# Patient Record
Sex: MALE | Race: WHITE | NOT HISPANIC OR LATINO | URBAN - METROPOLITAN AREA
[De-identification: names, ages, dates, MRNs, and addresses within clinical notes are randomized per-mention and may not be internally consistent; named-entity substitution may affect disease eponyms.]

---
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
## 2023-07-02 ENCOUNTER — APPOINTMENT (EMERGENCY)
Dept: RADIOLOGY | Facility: HOSPITAL | Age: 23
End: 2023-07-02
Payer: MEDICARE

## 2023-07-02 ENCOUNTER — HOSPITAL ENCOUNTER (EMERGENCY)
Facility: HOSPITAL | Age: 23
Discharge: HOME | End: 2023-07-02
Payer: MEDICARE

## 2023-07-02 VITALS
WEIGHT: 254.85 LBS | RESPIRATION RATE: 13 BRPM | TEMPERATURE: 98.5 F | HEART RATE: 80 BPM | DIASTOLIC BLOOD PRESSURE: 55 MMHG | SYSTOLIC BLOOD PRESSURE: 113 MMHG | OXYGEN SATURATION: 97 %

## 2023-07-02 DIAGNOSIS — K20.90 ESOPHAGITIS: Primary | ICD-10-CM

## 2023-07-02 DIAGNOSIS — M79.606: ICD-10-CM

## 2023-07-02 LAB
2HR DELTA HS TROPONIN: -1 NG/L
ALBUMIN SERPL BCP-MCNC: 4 G/DL (ref 3.5–5)
ALP SERPL-CCNC: 73 U/L (ref 34–104)
ALT SERPL W P-5'-P-CCNC: 27 U/L (ref 7–52)
ANION GAP SERPL CALCULATED.3IONS-SCNC: 7 MMOL/L
AST SERPL W P-5'-P-CCNC: 21 U/L (ref 13–39)
ATRIAL RATE: 81 BPM
ATRIAL RATE: 92 BPM
BASOPHILS # BLD AUTO: 0.09 THOUSANDS/ÂΜL (ref 0–0.1)
BASOPHILS NFR BLD AUTO: 1 % (ref 0–1)
BILIRUB SERPL-MCNC: 0.27 MG/DL (ref 0.2–1)
BUN SERPL-MCNC: 14 MG/DL (ref 5–25)
CALCIUM SERPL-MCNC: 9.2 MG/DL (ref 8.4–10.2)
CARDIAC TROPONIN I PNL SERPL HS: 2 NG/L
CARDIAC TROPONIN I PNL SERPL HS: 3 NG/L
CHLORIDE SERPL-SCNC: 103 MMOL/L (ref 96–108)
CO2 SERPL-SCNC: 30 MMOL/L (ref 21–32)
CREAT SERPL-MCNC: 0.92 MG/DL (ref 0.6–1.3)
D DIMER PPP FEU-MCNC: 0.32 UG/ML FEU
EOSINOPHIL # BLD AUTO: 0.54 THOUSAND/ÂΜL (ref 0–0.61)
EOSINOPHIL NFR BLD AUTO: 6 % (ref 0–6)
ERYTHROCYTE [DISTWIDTH] IN BLOOD BY AUTOMATED COUNT: 14.3 % (ref 11.6–15.1)
GFR SERPL CREATININE-BSD FRML MDRD: 118 ML/MIN/1.73SQ M
GLUCOSE SERPL-MCNC: 101 MG/DL (ref 65–140)
HCT VFR BLD AUTO: 43.3 % (ref 36.5–49.3)
HGB BLD-MCNC: 13.3 G/DL (ref 12–17)
IMM GRANULOCYTES # BLD AUTO: 0.02 THOUSAND/UL (ref 0–0.2)
IMM GRANULOCYTES NFR BLD AUTO: 0 % (ref 0–2)
LYMPHOCYTES # BLD AUTO: 2.9 THOUSANDS/ÂΜL (ref 0.6–4.47)
LYMPHOCYTES NFR BLD AUTO: 30 % (ref 14–44)
MCH RBC QN AUTO: 25.1 PG (ref 26.8–34.3)
MCHC RBC AUTO-ENTMCNC: 30.7 G/DL (ref 31.4–37.4)
MCV RBC AUTO: 82 FL (ref 82–98)
MONOCYTES # BLD AUTO: 0.41 THOUSAND/ÂΜL (ref 0.17–1.22)
MONOCYTES NFR BLD AUTO: 4 % (ref 4–12)
NEUTROPHILS # BLD AUTO: 5.8 THOUSANDS/ÂΜL (ref 1.85–7.62)
NEUTS SEG NFR BLD AUTO: 59 % (ref 43–75)
NRBC BLD AUTO-RTO: 0 /100 WBCS
P AXIS: 54 DEGREES
P AXIS: 57 DEGREES
PLATELET # BLD AUTO: 357 THOUSANDS/UL (ref 149–390)
PMV BLD AUTO: 9.2 FL (ref 8.9–12.7)
POTASSIUM SERPL-SCNC: 4.2 MMOL/L (ref 3.5–5.3)
PR INTERVAL: 132 MS
PR INTERVAL: 150 MS
PROT SERPL-MCNC: 7.1 G/DL (ref 6.4–8.4)
QRS AXIS: 58 DEGREES
QRS AXIS: 66 DEGREES
QRSD INTERVAL: 88 MS
QRSD INTERVAL: 94 MS
QT INTERVAL: 346 MS
QT INTERVAL: 358 MS
QTC INTERVAL: 415 MS
QTC INTERVAL: 427 MS
RBC # BLD AUTO: 5.3 MILLION/UL (ref 3.88–5.62)
SODIUM SERPL-SCNC: 140 MMOL/L (ref 135–147)
T WAVE AXIS: 35 DEGREES
T WAVE AXIS: 36 DEGREES
VENTRICULAR RATE: 81 BPM
VENTRICULAR RATE: 92 BPM
WBC # BLD AUTO: 9.76 THOUSAND/UL (ref 4.31–10.16)

## 2023-07-02 PROCEDURE — 99285 EMERGENCY DEPT VISIT HI MDM: CPT

## 2023-07-02 PROCEDURE — 93010 ELECTROCARDIOGRAM REPORT: CPT | Performed by: STUDENT IN AN ORGANIZED HEALTH CARE EDUCATION/TRAINING PROGRAM

## 2023-07-02 PROCEDURE — 71046 X-RAY EXAM CHEST 2 VIEWS: CPT

## 2023-07-02 PROCEDURE — 93005 ELECTROCARDIOGRAM TRACING: CPT

## 2023-07-02 PROCEDURE — 85379 FIBRIN DEGRADATION QUANT: CPT | Performed by: PHYSICIAN ASSISTANT

## 2023-07-02 PROCEDURE — 84484 ASSAY OF TROPONIN QUANT: CPT | Performed by: PHYSICIAN ASSISTANT

## 2023-07-02 PROCEDURE — 85025 COMPLETE CBC W/AUTO DIFF WBC: CPT | Performed by: PHYSICIAN ASSISTANT

## 2023-07-02 PROCEDURE — 80053 COMPREHEN METABOLIC PANEL: CPT | Performed by: PHYSICIAN ASSISTANT

## 2023-07-02 PROCEDURE — 36415 COLL VENOUS BLD VENIPUNCTURE: CPT | Performed by: PHYSICIAN ASSISTANT

## 2023-07-02 RX ORDER — ACETAMINOPHEN 325 MG/1
975 TABLET ORAL ONCE
Status: COMPLETED | OUTPATIENT
Start: 2023-07-02 | End: 2023-07-02

## 2023-07-02 RX ORDER — ASPIRIN 81 MG/1
324 TABLET, CHEWABLE ORAL ONCE
Status: COMPLETED | OUTPATIENT
Start: 2023-07-02 | End: 2023-07-02

## 2023-07-02 RX ORDER — ALUMINA, MAGNESIA, AND SIMETHICONE 2400; 2400; 240 MG/30ML; MG/30ML; MG/30ML
20 SUSPENSION ORAL AS NEEDED
Qty: 355 ML | Refills: 0 | Status: SHIPPED | OUTPATIENT
Start: 2023-07-02

## 2023-07-02 RX ADMIN — ACETAMINOPHEN 325MG 975 MG: 325 TABLET ORAL at 17:23

## 2023-07-02 RX ADMIN — ASPIRIN 81 MG 324 MG: 81 TABLET ORAL at 17:23

## 2023-07-02 NOTE — ED PROVIDER NOTES
History  Chief Complaint   Patient presents with   • Chest Pain     Reports chest pain since Friday. Also reports "at times it feels like my legs are being torn off"       Chest Pain  Pain location:  Substernal area  Pain quality: burning and pressure    Pain radiates to:  Does not radiate  Pain radiates to the back: no    Pain severity:  Mild  Onset quality:  Gradual  Timing:  Intermittent  Progression:  Improving  Context: movement    Context comment:  Patient states after eating and with belching  Relieved by:  Nothing  Worsened by:  Nothing tried  Ineffective treatments:  None tried  Associated symptoms: heartburn and nausea    Associated symptoms: no abdominal pain, no back pain, no claudication, no diaphoresis, no dizziness, no dysphagia, no fever, no numbness, no orthopnea, no palpitations, no shortness of breath and not vomiting    Risk factors: male sex and obesity    Risk factors: no coronary artery disease, no diabetes mellitus, no high cholesterol, no hypertension and no prior DVT/PE        None       Past Medical History:   Diagnosis Date   • Rhinitis, allergic        History reviewed. No pertinent surgical history. History reviewed. No pertinent family history. I have reviewed and agree with the history as documented. E-Cigarette/Vaping   • E-Cigarette Use Never User      E-Cigarette/Vaping Substances     Social History     Tobacco Use   • Smoking status: Never   • Smokeless tobacco: Never   Vaping Use   • Vaping Use: Never used   Substance Use Topics   • Alcohol use: Not Currently   • Drug use: Yes     Types: Marijuana       Review of Systems   Constitutional: Negative for diaphoresis and fever. HENT: Negative for trouble swallowing. Respiratory: Negative for shortness of breath. Cardiovascular: Positive for chest pain. Negative for palpitations, orthopnea and claudication. Gastrointestinal: Positive for heartburn and nausea. Negative for abdominal pain and vomiting.    Endocrine: Negative for polyphagia and polyuria. Genitourinary: Negative for dysuria. Musculoskeletal: Negative for back pain. Skin: Negative for wound. Neurological: Negative for dizziness and numbness. Hematological: Negative for adenopathy. Psychiatric/Behavioral: Negative for agitation. All other systems reviewed and are negative. Physical Exam  Physical Exam  Vitals reviewed. Constitutional:       Appearance: He is well-developed. He is obese. HENT:      Head: Normocephalic and atraumatic. Eyes:      Extraocular Movements: Extraocular movements intact. Pupils: Pupils are equal, round, and reactive to light. Neck:      Vascular: No JVD. Cardiovascular:      Rate and Rhythm: Normal rate. Heart sounds: Normal heart sounds. Pulmonary:      Effort: Pulmonary effort is normal.      Breath sounds: Normal breath sounds. Chest:      Chest wall: No mass, tenderness or edema. Abdominal:      Palpations: Abdomen is soft. There is no mass. Tenderness: There is no guarding. Comments: Obese abdomen. Musculoskeletal:         General: Normal range of motion. Cervical back: Normal range of motion and neck supple. Right lower leg: No tenderness. No edema. Left lower leg: No tenderness. No edema. Skin:     General: Skin is warm and dry. Neurological:      General: No focal deficit present. Mental Status: He is alert. He is disoriented. Motor: Motor function is intact. Deep Tendon Reflexes:      Reflex Scores:       Patellar reflexes are 2+ on the right side and 2+ on the left side. Achilles reflexes are 2+ on the right side and 2+ on the left side. Comments: Patient ambulatory in room no evidence of foot drop. Normal gait. Sensation intact to light touch lower extremities bilaterally.    Psychiatric:         Mood and Affect: Mood normal.         Vital Signs  ED Triage Vitals [07/02/23 1647]   Temperature Pulse Respirations Blood Pressure SpO2   98.5 °F (36.9 °C) 90 18 133/82 99 %      Temp Source Heart Rate Source Patient Position - Orthostatic VS BP Location FiO2 (%)   Oral Monitor Sitting Right arm --      Pain Score       No Pain           Vitals:    07/02/23 1647 07/02/23 1830 07/02/23 1915   BP: 133/82 117/58 113/55   Pulse: 90 87 80   Patient Position - Orthostatic VS: Sitting Lying Lying         Visual Acuity      ED Medications  Medications   aspirin chewable tablet 324 mg (324 mg Oral Given 7/2/23 1723)   acetaminophen (TYLENOL) tablet 975 mg (975 mg Oral Given 7/2/23 1723)       Diagnostic Studies  Results Reviewed     Procedure Component Value Units Date/Time    HS Troponin I 2hr [[id number]]  (Normal) Collected: 07/02/23 1936    Lab Status: Final result Specimen: Blood from Line, Venous Updated: 07/02/23 2002     hs TnI 2hr 2 ng/L      Delta 2hr hsTnI -1 ng/L     D-Dimer [[id number]]  (Normal) Collected: 07/02/23 1729    Lab Status: Final result Specimen: Blood from Arm, Right Updated: 07/02/23 1821     D-Dimer, Quant 0.32 ug/ml FEU     HS Troponin 0hr (reflex protocol) [[id number]]  (Normal) Collected: 07/02/23 1729    Lab Status: Final result Specimen: Blood from Arm, Right Updated: 07/02/23 1804     hs TnI 0hr 3 ng/L     Comprehensive metabolic panel [[id number]] Collected: 07/02/23 1729    Lab Status: Final result Specimen: Blood from Arm, Right Updated: 07/02/23 1756     Sodium 140 mmol/L      Potassium 4.2 mmol/L      Chloride 103 mmol/L      CO2 30 mmol/L      ANION GAP 7 mmol/L      BUN 14 mg/dL      Creatinine 0.92 mg/dL      Glucose 101 mg/dL      Calcium 9.2 mg/dL      AST 21 U/L      ALT 27 U/L      Alkaline Phosphatase 73 U/L      Total Protein 7.1 g/dL      Albumin 4.0 g/dL      Total Bilirubin 0.27 mg/dL      eGFR 118 ml/min/1.73sq m     Narrative:      [place] guidelines for Chronic Kidney Disease (CKD):   •  Stage 1 with normal or high GFR (GFR > 90 mL/min/1.73 square meters)  •  Stage 2 Mild CKD (GFR = 60-89 mL/min/1.73 square meters)  •  Stage 3A Moderate CKD (GFR = 45-59 mL/min/1.73 square meters)  •  Stage 3B Moderate CKD (GFR = 30-44 mL/min/1.73 square meters)  •  Stage 4 Severe CKD (GFR = 15-29 mL/min/1.73 square meters)  •  Stage 5 End Stage CKD (GFR <15 mL/min/1.73 square meters)  Note: GFR calculation is accurate only with a steady state creatinine    CBC and differential [[id number]]  (Abnormal) Collected: 07/02/23 1729    Lab Status: Final result Specimen: Blood from Arm, Right Updated: 07/02/23 1734     WBC 9.76 Thousand/uL      RBC 5.30 Million/uL      Hemoglobin 13.3 g/dL      Hematocrit 43.3 %      MCV 82 fL      MCH 25.1 pg      MCHC 30.7 g/dL      RDW 14.3 %      MPV 9.2 fL      Platelets 504 Thousands/uL      nRBC 0 /100 WBCs      Neutrophils Relative 59 %      Immat GRANS % 0 %      Lymphocytes Relative 30 %      Monocytes Relative 4 %      Eosinophils Relative 6 %      Basophils Relative 1 %      Neutrophils Absolute 5.80 Thousands/µL      Immature Grans Absolute 0.02 Thousand/uL      Lymphocytes Absolute 2.90 Thousands/µL      Monocytes Absolute 0.41 Thousand/µL      Eosinophils Absolute 0.54 Thousand/µL      Basophils Absolute 0.09 Thousands/µL                  XR chest 2 views   ED Interpretation by Linda Hay PA-C (07/02 2018)   No acute process appreciated. Procedures  Procedures         ED Course                               SBIRT 20yo+    Flowsheet Row Most Recent Value   Initial Alcohol Screen: US AUDIT-C     1. How often do you have a drink containing alcohol? 1 Filed at: 07/02/2023 1729   2. How many drinks containing alcohol do you have on a typical day you are drinking? 0 Filed at: 07/02/2023 1729   3a. Male UNDER 65: How often do you have five or more drinks on one occasion? 0 Filed at: 07/02/2023 1729   Audit-C Score 1 Filed at: 07/02/2023 1729   MARCI: How many times in the past year have you. ..     Used an illegal drug or used a prescription medication for non-medical reasons? Never Filed at: 07/02/2023 1729                    Medical Decision Making  26-year-old male presents for anterior chest pain and bilateral posterior leg discomfort. Past medical history reviewed with patient as stated on chart. Patient states approximately 3 days ago he began to have some anterior chest discomfort while belching. Patient states this has been intermittent. Patient fearful of his heart "blowing up". Patient states that his stepfather had a massive heart attack or heart rupture. Patient's mother who is in the room also states this and this is also her concern. No genetic relation to stepfather. Patient is bright alert active. No acute distress. No chest pain appreciated. Abdomen is obese nontender. Overall neurological exam is benign. No evidence of foot drop or neurological focus. Also complains of right and left posterior leg discomfort provided about into the hamstrings does not radiate and a radicular fashion. Intermittent. Labs reviewed with patient EKG reviewed with patient chest x-ray reviewed with patient and mother. At this time is most likely esophagitis. Encourage patient manage symptoms with lifestyle modifications such as diet improvement physical activity. Provided YouTube videos for dietary instruction. Encouraged patient to obtain primary care as he is now 25years of age and would need an adult primary care. Educated patient to take Mylanta intermittently for symptom relief. Educated on timing of improvement. Educated patient on persistent or worsening sinus symptoms and need to follow-up with primary care and/or return to the emergency department. Patient and mother admits to understanding and agreement. Patient at the end of visit stated that he did have moderate improvement of his overall condition. Patient was felt stable for discharge to home. Patient was discharged in amatory improved condition.     Esophagitis:     Details: Anterior chest discomfort after eating meals with belching. Musculoskeletal leg pain:     Details: Posterior leg discomfort intermittent most likely musculoskeletal leg discomfort. Amount and/or Complexity of Data Reviewed  External Data Reviewed: labs and radiology. Labs: ordered. Details: Labs ordered for chest pain work-up in an obese 71-year-old male  Radiology: ordered and independent interpretation performed. Details: Chest x-ray emergency department work-up in obese 71-year-old male      Risk  OTC drugs. Disposition  Final diagnoses:   Esophagitis   Musculoskeletal leg pain     Time reflects when diagnosis was documented in both MDM as applicable and the Disposition within this note     Time User Action Codes Description Comment    7/2/2023  8:18 PM [employer] Add [K20.90] Esophagitis     7/2/2023  8:19 PM [employer] Add [B37.663] Musculoskeletal leg pain       ED Disposition     ED Disposition   Discharge    Condition   Stable    Date/Time   Sun Jul 2, 2023  8:07 PM    Comment   Jac Babinski discharge to home/self care. Follow-up Information     Follow up With Specialties Details Why Contact Info [facility]   [address] [facility], [address] [place] [address], [address] [facility]          Discharge Medication List as of 7/2/2023  8:20 PM      START taking these medications    Details   aluminum-magnesium hydroxide-simethicone (MAALOX MAX) 400-400-40 MG/5ML suspension Take 20 mL by mouth as needed for indigestion or heartburn, Starting Sun 7/2/2023, Normal             No discharge procedures on file.     PDMP Review     None          ED Provider  Electronically Signed by           Laurel Tabor PA-C  07/03/23 2269

## 2023-07-03 NOTE — ED NOTES
Pt AVS reviewed by ED provider. Pt IV removed by ED provider. This RN not at the bedside.       Winsome Moe RN  07/02/23 0455

## 2024-05-01 ENCOUNTER — HOSPITAL ENCOUNTER (EMERGENCY)
Facility: HOSPITAL | Age: 24
Discharge: HOME | End: 2024-05-01
Attending: EMERGENCY MEDICINE
Payer: MEDICARE

## 2024-05-01 ENCOUNTER — APPOINTMENT (EMERGENCY)
Dept: RADIOLOGY | Facility: HOSPITAL | Age: 24
End: 2024-05-01
Payer: MEDICARE

## 2024-05-01 VITALS
SYSTOLIC BLOOD PRESSURE: 120 MMHG | RESPIRATION RATE: 20 BRPM | WEIGHT: 258.82 LBS | TEMPERATURE: 98.1 F | HEART RATE: 87 BPM | OXYGEN SATURATION: 94 % | DIASTOLIC BLOOD PRESSURE: 67 MMHG

## 2024-05-01 DIAGNOSIS — J45.901 ASTHMA EXACERBATION: Primary | ICD-10-CM

## 2024-05-01 LAB
2HR DELTA HS TROPONIN: -2 NG/L
ATRIAL RATE: 102 BPM
ATRIAL RATE: 94 BPM
BASOPHILS # BLD AUTO: 0.1 THOUSANDS/ÂΜL (ref 0–0.1)
BASOPHILS NFR BLD AUTO: 1 % (ref 0–1)
CARDIAC TROPONIN I PNL SERPL HS: 5 NG/L
CARDIAC TROPONIN I PNL SERPL HS: 7 NG/L
EOSINOPHIL # BLD AUTO: 0.49 THOUSAND/ÂΜL (ref 0–0.61)
EOSINOPHIL NFR BLD AUTO: 5 % (ref 0–6)
ERYTHROCYTE [DISTWIDTH] IN BLOOD BY AUTOMATED COUNT: 13.3 % (ref 11.6–15.1)
FLUAV RNA RESP QL NAA+PROBE: NEGATIVE
FLUBV RNA RESP QL NAA+PROBE: NEGATIVE
HCT VFR BLD AUTO: 45.5 % (ref 36.5–49.3)
HGB BLD-MCNC: 14.1 G/DL (ref 12–17)
IMM GRANULOCYTES # BLD AUTO: 0.02 THOUSAND/UL (ref 0–0.2)
IMM GRANULOCYTES NFR BLD AUTO: 0 % (ref 0–2)
LYMPHOCYTES # BLD AUTO: 3.39 THOUSANDS/ÂΜL (ref 0.6–4.47)
LYMPHOCYTES NFR BLD AUTO: 34 % (ref 14–44)
MCH RBC QN AUTO: 26.2 PG (ref 26.8–34.3)
MCHC RBC AUTO-ENTMCNC: 31 G/DL (ref 31.4–37.4)
MCV RBC AUTO: 84 FL (ref 82–98)
MONOCYTES # BLD AUTO: 0.58 THOUSAND/ÂΜL (ref 0.17–1.22)
MONOCYTES NFR BLD AUTO: 6 % (ref 4–12)
NEUTROPHILS # BLD AUTO: 5.27 THOUSANDS/ÂΜL (ref 1.85–7.62)
NEUTS SEG NFR BLD AUTO: 54 % (ref 43–75)
NRBC BLD AUTO-RTO: 0 /100 WBCS
P AXIS: 56 DEGREES
P AXIS: 69 DEGREES
PLATELET # BLD AUTO: 362 THOUSANDS/UL (ref 149–390)
PMV BLD AUTO: 9.2 FL (ref 8.9–12.7)
PR INTERVAL: 136 MS
PR INTERVAL: 140 MS
QRS AXIS: 67 DEGREES
QRS AXIS: 78 DEGREES
QRSD INTERVAL: 90 MS
QRSD INTERVAL: 92 MS
QT INTERVAL: 330 MS
QT INTERVAL: 352 MS
QTC INTERVAL: 430 MS
QTC INTERVAL: 440 MS
RBC # BLD AUTO: 5.39 MILLION/UL (ref 3.88–5.62)
RSV RNA RESP QL NAA+PROBE: NEGATIVE
SARS-COV-2 RNA RESP QL NAA+PROBE: NEGATIVE
T WAVE AXIS: 27 DEGREES
T WAVE AXIS: 53 DEGREES
VENTRICULAR RATE: 102 BPM
VENTRICULAR RATE: 94 BPM
WBC # BLD AUTO: 9.85 THOUSAND/UL (ref 4.31–10.16)

## 2024-05-01 PROCEDURE — 99285 EMERGENCY DEPT VISIT HI MDM: CPT | Performed by: EMERGENCY MEDICINE

## 2024-05-01 PROCEDURE — 84484 ASSAY OF TROPONIN QUANT: CPT

## 2024-05-01 PROCEDURE — 94640 AIRWAY INHALATION TREATMENT: CPT

## 2024-05-01 PROCEDURE — 93010 ELECTROCARDIOGRAM REPORT: CPT | Performed by: INTERNAL MEDICINE

## 2024-05-01 PROCEDURE — 36415 COLL VENOUS BLD VENIPUNCTURE: CPT

## 2024-05-01 PROCEDURE — 99285 EMERGENCY DEPT VISIT HI MDM: CPT

## 2024-05-01 PROCEDURE — 93005 ELECTROCARDIOGRAM TRACING: CPT

## 2024-05-01 PROCEDURE — 96374 THER/PROPH/DIAG INJ IV PUSH: CPT

## 2024-05-01 PROCEDURE — 80053 COMPREHEN METABOLIC PANEL: CPT

## 2024-05-01 PROCEDURE — 85025 COMPLETE CBC W/AUTO DIFF WBC: CPT

## 2024-05-01 PROCEDURE — 71045 X-RAY EXAM CHEST 1 VIEW: CPT

## 2024-05-01 PROCEDURE — 0241U HB NFCT DS VIR RESP RNA 4 TRGT: CPT

## 2024-05-01 RX ORDER — ALBUTEROL SULFATE 2.5 MG/3ML
5 SOLUTION RESPIRATORY (INHALATION) ONCE
Status: COMPLETED | OUTPATIENT
Start: 2024-05-01 | End: 2024-05-01

## 2024-05-01 RX ORDER — ALBUTEROL SULFATE 90 UG/1
2 AEROSOL, METERED RESPIRATORY (INHALATION) EVERY 6 HOURS PRN
Qty: 8.5 G | Refills: 0 | Status: SHIPPED | OUTPATIENT
Start: 2024-05-01

## 2024-05-01 RX ORDER — PREDNISONE 20 MG/1
40 TABLET ORAL ONCE
Status: COMPLETED | OUTPATIENT
Start: 2024-05-01 | End: 2024-05-01

## 2024-05-01 RX ORDER — KETOROLAC TROMETHAMINE 30 MG/ML
15 INJECTION, SOLUTION INTRAMUSCULAR; INTRAVENOUS ONCE
Status: COMPLETED | OUTPATIENT
Start: 2024-05-01 | End: 2024-05-01

## 2024-05-01 RX ORDER — LIDOCAINE 50 MG/G
1 PATCH TOPICAL ONCE
Status: DISCONTINUED | OUTPATIENT
Start: 2024-05-01 | End: 2024-05-01 | Stop reason: HOSPADM

## 2024-05-01 RX ORDER — PREDNISONE 20 MG/1
40 TABLET ORAL DAILY
Qty: 8 TABLET | Refills: 0 | Status: SHIPPED | OUTPATIENT
Start: 2024-05-02 | End: 2024-05-06

## 2024-05-01 RX ADMIN — LIDOCAINE 1 PATCH: 700 PATCH TOPICAL at 02:04

## 2024-05-01 RX ADMIN — IPRATROPIUM BROMIDE 0.5 MG: 0.5 SOLUTION RESPIRATORY (INHALATION) at 01:26

## 2024-05-01 RX ADMIN — KETOROLAC TROMETHAMINE 15 MG: 30 INJECTION, SOLUTION INTRAMUSCULAR; INTRAVENOUS at 02:02

## 2024-05-01 RX ADMIN — PREDNISONE 40 MG: 20 TABLET ORAL at 01:25

## 2024-05-01 RX ADMIN — ALBUTEROL SULFATE 5 MG: 2.5 SOLUTION RESPIRATORY (INHALATION) at 01:26

## 2024-05-01 NOTE — ED NOTES
Patient seen by provider with discharge and follow up instructions given to patient and family. Patient offered no complaints on discharge.     Amy Newman RN  05/01/24 [id number]

## 2024-05-01 NOTE — ED ATTENDING ATTESTATION
5/1/2024  IRand MD, saw and evaluated the patient. I have discussed the patient with the resident/non-physician practitioner and agree with the resident's/non-physician practitioner's findings, Plan of Care, and MDM as documented in the resident's/non-physician practitioner's note, except where noted. All available labs and Radiology studies were reviewed.  I was present for key portions of any procedure(s) performed by the resident/non-physician practitioner and I was immediately available to provide assistance.       At this point I agree with the current assessment done in the Emergency Department.  I have conducted an independent evaluation of this patient a history and physical is as follows:    Chief Complaint   Patient presents with    Chest Pain     Arrives reporting central chest pain since 7pm tonight when he was carrying laundry. + SOB and coughing.         HPI:  22 yo M mild intermittent asthma, presents to ed for eval of sob and chest discomfort / tightness. Ongoing since tonight. +cough. Non productive. Didn't have his inhaler, gave his to his aunt. No fevers.    Patient denies prior intubations or hospitalizations for asthma.   Speaking in full sentences without difficulty.   Last steroid use: unknown        Past Medical History:   Diagnosis Date    Asthma     Rhinitis, allergic         No current facility-administered medications for this encounter.    Current Outpatient Medications:     albuterol (ProAir HFA) 90 mcg/act inhaler, Inhale 2 puffs every 6 (six) hours as needed for wheezing, Disp: 8.5 g, Rfl: 0    [START ON 5/2/2024] predniSONE 20 mg tablet, Take 2 tablets (40 mg total) by mouth daily for 4 days Do not start before May 2, 2024., Disp: 8 tablet, Rfl: 0    aluminum-magnesium hydroxide-simethicone (MAALOX MAX) 400-400-40 MG/5ML suspension, Take 20 mL by mouth as needed for indigestion or heartburn (Patient not taking: Reported on 5/1/2024), Disp: 355 mL, Rfl: 0     Exam:  Diffuse  insp /exp wheezing throughout  Otherwise well appearing    A/P:  Mild asthma exacerbation. Duo-neb prednisone. Delta trop negative.  Pcp follow up.     ED Course         Critical Care Time  Procedures

## 2024-05-01 NOTE — DISCHARGE INSTRUCTIONS
Take steroids once daily for 4 more days starting THURSDAY  Inhaler -- 2 puffs every 4-6 hours as needed until flare has passed

## 2024-05-01 NOTE — Clinical Note
Abelardo Burch was seen and treated in our emergency department on 5/1/2024.                Diagnosis:     Abelardo  is off the rest of the shift today, may return to work on return date.    He may return on this date: 05/03/2024         If you have any questions or concerns, please don't hesitate to call.      Sue Parada MD    ______________________________           _______________          _______________  Hospital Representative                              Date                                Time

## 2024-05-01 NOTE — ED PROVIDER NOTES
History  Chief Complaint   Patient presents with    Chest Pain     Arrives reporting central chest pain since 7pm tonight when he was carrying laundry. + SOB and coughing.      HPI 23yoM with hx of asthma presents to the ED for evaluation of no radiating, substernal chest tightness that started around 4pm then went away, then came back at 6pm and has been constant since. Worse when he laid down to go to sleep. Associated with non productive cough. Denies pleuritic chest pain. No prior hx of DVT or PE, no recent surgeries, denies unilateral calf pain or swelling. States he was doing some laundry today and lifting the laundry cart up and down some steps and then helping his neighbor move some stuff. Did not try any therapies PTA.    Prior to Admission Medications   Prescriptions Last Dose Informant Patient Reported? Taking?   aluminum-magnesium hydroxide-simethicone (MAALOX MAX) 400-400-40 MG/5ML suspension Not Taking  No No   Sig: Take 20 mL by mouth as needed for indigestion or heartburn   Patient not taking: Reported on 5/1/2024      Facility-Administered Medications: None       Past Medical History:   Diagnosis Date    Asthma     Rhinitis, allergic        History reviewed. No pertinent surgical history.    History reviewed. No pertinent family history.  I have reviewed and agree with the history as documented.    E-Cigarette/Vaping    E-Cigarette Use Never User      E-Cigarette/Vaping Substances     Social History     Tobacco Use    Smoking status: Never    Smokeless tobacco: Never   Vaping Use    Vaping status: Never Used   Substance Use Topics    Alcohol use: Not Currently    Drug use: Yes     Types: Marijuana        Review of Systems   Constitutional:  Negative for chills and fever.   HENT:  Negative for ear pain and sore throat.    Eyes:  Negative for pain and visual disturbance.   Respiratory:  Positive for cough and chest tightness. Negative for shortness of breath.    Cardiovascular:  Negative for chest  pain and leg swelling.   Gastrointestinal:  Negative for abdominal pain, blood in stool, constipation, diarrhea, nausea and vomiting.   Genitourinary:  Negative for dysuria and hematuria.   Musculoskeletal:  Negative for neck pain and neck stiffness.   Neurological:  Negative for dizziness, syncope, weakness and light-headedness.   All other systems reviewed and are negative.      Physical Exam  ED Triage Vitals   Temperature Pulse Respirations Blood Pressure SpO2   05/01/24 0109 05/01/24 0109 05/01/24 0109 05/01/24 0109 05/01/24 0109   98.1 °F (36.7 °C) 100 20 140/75 94 %      Temp Source Heart Rate Source Patient Position - Orthostatic VS BP Location FiO2 (%)   05/01/24 0109 05/01/24 0109 05/01/24 0109 05/01/24 0109 --   Oral Monitor Lying Left arm       Pain Score       05/01/24 0202       8             Orthostatic Vital Signs  Vitals:    05/01/24 0109 05/01/24 0314   BP: 140/75 117/69   Pulse: 100 88   Patient Position - Orthostatic VS: Lying Sitting       Physical Exam  Vitals and nursing note reviewed.   Constitutional:       General: He is not in acute distress.     Appearance: He is well-developed. He is not ill-appearing, toxic-appearing or diaphoretic.   HENT:      Head: Normocephalic and atraumatic.   Eyes:      Extraocular Movements: Extraocular movements intact.      Conjunctiva/sclera: Conjunctivae normal.   Cardiovascular:      Rate and Rhythm: Normal rate and regular rhythm.      Heart sounds: Normal heart sounds. No murmur heard.  Pulmonary:      Effort: Pulmonary effort is normal. No respiratory distress.      Breath sounds: Examination of the right-upper field reveals wheezing. Examination of the left-upper field reveals wheezing. Examination of the right-middle field reveals wheezing. Examination of the left-middle field reveals wheezing. Examination of the right-lower field reveals wheezing. Examination of the left-lower field reveals wheezing. Wheezing present. No decreased breath sounds,  rhonchi or rales.   Chest:      Chest wall: No mass, deformity, tenderness, crepitus or edema. There is no dullness to percussion.   Abdominal:      Palpations: Abdomen is soft. There is no mass.      Tenderness: There is no abdominal tenderness. There is no guarding or rebound.   Musculoskeletal:         General: No swelling.      Cervical back: Neck supple.      Right lower leg: No tenderness. No edema.      Left lower leg: No tenderness. No edema.   Skin:     General: Skin is warm and dry.      Capillary Refill: Capillary refill takes less than 2 seconds.      Coloration: Skin is not cyanotic or pale.      Findings: No ecchymosis, erythema or rash.      Nails: There is no clubbing.   Neurological:      General: No focal deficit present.      Mental Status: He is alert and oriented to person, place, and time.   Psychiatric:         Mood and Affect: Mood normal.         ED Medications  Medications   lidocaine (LIDODERM) 5 % patch 1 patch (1 patch Topical Medication Applied 5/1/24 0204)   albuterol inhalation solution 5 mg (5 mg Nebulization Given 5/1/24 0126)   ipratropium (ATROVENT) 0.02 % inhalation solution 0.5 mg (0.5 mg Nebulization Given 5/1/24 0126)   predniSONE tablet 40 mg (40 mg Oral Given 5/1/24 0125)   ketorolac (TORADOL) injection 15 mg (15 mg Intravenous Given 5/1/24 0202)       Diagnostic Studies  Results Reviewed       Procedure Component Value Units Date/Time    HS Troponin I 2hr [[id number]]  (Normal) Collected: 05/01/24 0308    Lab Status: Final result Specimen: Blood from Arm, Right Updated: 05/01/24 0337     hs TnI 2hr 5 ng/L      Delta 2hr hsTnI -2 ng/L     HS Troponin I 4hr [[id number]]     Lab Status: No result Specimen: Blood     FLU/RSV/COVID - if FLU/RSV clinically relevant [[id number]]  (Normal) Collected: 05/01/24 0129    Lab Status: Final result Specimen: Nares from Nose Updated: 05/01/24 0212     SARS-CoV-2 Negative     INFLUENZA A PCR Negative     INFLUENZA B PCR Negative     RSV PCR  Negative    Narrative:      FOR PEDIATRIC PATIENTS - copy/paste COVID Guidelines URL to browser: [email or web address]    SARS-CoV-2 assay is a Nucleic Acid Amplification assay intended for the  qualitative detection of nucleic acid from SARS-CoV-2 in nasopharyngeal  swabs. Results are for the presumptive identification of SARS-CoV-2 RNA.    Positive results are indicative of infection with SARS-CoV-2, the virus  causing COVID-19, but do not rule out bacterial infection or co-infection  with other viruses. Laboratories within the [place] and its  territories are required to report all positive results to the appropriate  public health authorities. Negative results do not preclude SARS-CoV-2  infection and should not be used as the sole basis for treatment or other  patient management decisions. Negative results must be combined with  clinical observations, patient history, and epidemiological information.  This test has not been FDA cleared or approved.    This test has been authorized by FDA under an Emergency Use Authorization  (EUA). This test is only authorized for the duration of time the  declaration that circumstances exist justifying the authorization of the  emergency use of an in vitro diagnostic tests for detection of SARS-CoV-2  virus and/or diagnosis of COVID-19 infection under section 564(b)(1) of  the Act, 21 U.S.C. 360bbb-3(b)(1), unless the authorization is terminated  or revoked sooner. The test has been validated but independent review by FDA  and CLIA is pending.    Test performed using [employer] GeneXpert: This RT-PCR assay targets N2,  a region unique to SARS-CoV-2. A conserved region in the E-gene was chosen  for pan-Sarbecovirus detection which includes SARS-CoV-2.    According to CMS-2020-01-R, this platform meets the definition of high-throughput technology.    HS Troponin 0hr (reflex protocol) [[id number]]  (Normal) Collected:  05/01/24 0118    Lab Status: Final result Specimen: Blood from Arm, Right Updated: 05/01/24 0152     hs TnI 0hr 7 ng/L     Comprehensive metabolic panel [[id number]] Collected: 05/01/24 0118    Lab Status: Final result Specimen: Blood from Arm, Right Updated: 05/01/24 0148     Sodium 139 mmol/L      Potassium 4.2 mmol/L      Chloride 102 mmol/L      CO2 29 mmol/L      ANION GAP 8 mmol/L      BUN 22 mg/dL      Creatinine 0.99 mg/dL      Glucose 83 mg/dL      Calcium 8.8 mg/dL      AST 27 U/L      ALT 29 U/L      Alkaline Phosphatase 71 U/L      Total Protein 7.7 g/dL      Albumin 4.1 g/dL      Total Bilirubin 0.22 mg/dL      eGFR 107 ml/min/1.73sq m     Narrative:      National Kidney Disease Foundation guidelines for Chronic Kidney Disease (CKD):     Stage 1 with normal or high GFR (GFR > 90 mL/min/1.73 square meters)    Stage 2 Mild CKD (GFR = 60-89 mL/min/1.73 square meters)    Stage 3A Moderate CKD (GFR = 45-59 mL/min/1.73 square meters)    Stage 3B Moderate CKD (GFR = 30-44 mL/min/1.73 square meters)    Stage 4 Severe CKD (GFR = 15-29 mL/min/1.73 square meters)    Stage 5 End Stage CKD (GFR <15 mL/min/1.73 square meters)  Note: GFR calculation is accurate only with a steady state creatinine    CBC and differential [[id number]]  (Abnormal) Collected: 05/01/24 0118    Lab Status: Final result Specimen: Blood from Arm, Right Updated: 05/01/24 0131     WBC 9.85 Thousand/uL      RBC 5.39 Million/uL      Hemoglobin 14.1 g/dL      Hematocrit 45.5 %      MCV 84 fL      MCH 26.2 pg      MCHC 31.0 g/dL      RDW 13.3 %      MPV 9.2 fL      Platelets 362 Thousands/uL      nRBC 0 /100 WBCs      Segmented % 54 %      Immature Grans % 0 %      Lymphocytes % 34 %      Monocytes % 6 %      Eosinophils Relative 5 %      Basophils Relative 1 %      Absolute Neutrophils 5.27 Thousands/µL      Absolute Immature Grans 0.02 Thousand/uL      Absolute Lymphocytes 3.39 Thousands/µL      Absolute Monocytes 0.58 Thousand/µL      Eosinophils  Absolute 0.49 Thousand/µL      Basophils Absolute 0.10 Thousands/µL                    XR chest 1 view portable   ED Interpretation by Sue Parada MD (05/01 0224)   No evidence of acute cardiopulmonary process (focal consolidation, PTA, pleural effusion). Interpreted by me.             Procedures  ECG 12 Lead Documentation Only    Date/Time: 5/1/2024 1:25 AM    Performed by: Sue Parada MD  Authorized by: Sue Parada MD    Indications / Diagnosis:  Chest tightness  ECG reviewed by me, the ED Provider: yes    Patient location:  ED  Rate:     ECG rate:  102    ECG rate assessment: tachycardic    Rhythm:     Rhythm: sinus tachycardia    Ectopy:     Ectopy: none    QRS:     QRS axis:  Normal    QRS intervals:  Normal  Conduction:     Conduction: normal    ST segments:     ST segments:  Normal  T waves:     T waves: normal    Comments:      Qtc 430  ECG 12 Lead Documentation Only    Date/Time: 5/1/2024 3:36 AM    Performed by: Sue Parada MD  Authorized by: Sue Parada MD    Indications / Diagnosis:  2nd troponin  ECG reviewed by me, the ED Provider: yes    Patient location:  ED  Previous ECG:     Previous ECG:  Compared to current  Rate:     ECG rate:  94    ECG rate assessment: normal    Rhythm:     Rhythm: sinus rhythm    Ectopy:     Ectopy: none    QRS:     QRS axis:  Normal    QRS intervals:  Normal  Conduction:     Conduction: normal    ST segments:     ST segments:  Normal  T waves:     T waves: normal    Comments:      Qtc 440        ED Course  ED Course as of 05/01/24 0357   Wed May 01, 2024   0225 hs TnI 0hr: 7  Will obtain 2hr delta. EKG without evidence of acute ischemia.    0225 Flu covid RSV negative.    0225 Patient reports feeling improved after nebulizer treatment.    0345 Delta 2hr hsTnI: -2   0353 Wheezing resolved after nebulizer. Patient reports improvement in chest tightness.              HEART Risk Score      Flowsheet Row Most Recent Value   Heart Score Risk  Calculator    History 0 Filed at: 05/01/2024 0241   ECG 0 Filed at: 05/01/2024 0241   Age 0 Filed at: 05/01/2024 0241   Risk Factors 1 Filed at: 05/01/2024 0241   Troponin 0 Filed at: 05/01/2024 0241   HEART Score 1 Filed at: 05/01/2024 0241                        SBIRT 22yo+      Flowsheet Row Most Recent Value   Initial Alcohol Screen: US AUDIT-C     1. How often do you have a drink containing alcohol? 0 Filed at: 05/01/2024 0108   2. How many drinks containing alcohol do you have on a typical day you are drinking?  0 Filed at: 05/01/2024 0108   3a. Male UNDER 65: How often do you have five or more drinks on one occasion? 0 Filed at: 05/01/2024 0108   Audit-C Score 0 Filed at: 05/01/2024 0108   MARCI: How many times in the past year have you...    Used an illegal drug or used a prescription medication for non-medical reasons? Never Filed at: 05/01/2024 0108                  Medical Decision Making  23yoM with hx of asthma presents to the ED for evaluation of no radiating, substernal chest tightness that started around 4pm then went away, then came back at 6pm and has been constant since. Initially tachycardic in triage but HR 80s back in the room. Vitals wnl. Afebrile. Patient awake, alert, oriented. Speaking in full sentences, normal work of breathing. On exam, chest sounds tight with bilateral diffuse wheezing. Otherwise exam is unremarkable.     EKG without acute ischemic changes; troponin supports this. CXR without focal consolidation. Wheezing and chest tightness resolved after nebulizer treatment. Will send home with short course of steroids and inhaler. Discussed strict return precautions. Patient verbalizes his understanding. Discharged in stable condition.    Amount and/or Complexity of Data Reviewed  Labs:  Decision-making details documented in ED Course.  Radiology: independent interpretation performed.    Risk  Prescription drug management.          Disposition  Final diagnoses:   Asthma exacerbation      Time reflects when diagnosis was documented in both MDM as applicable and the Disposition within this note       Time User Action Codes Description Comment    5/1/2024  3:19 AM Sue Parada Add [J45.901] Asthma exacerbation           ED Disposition       ED Disposition   Discharge    Condition   Stable    Date/Time   Wed May 1, 2024 0315    Comment   Abelardo Burch Jr. discharge to home/self care.                   Follow-up Information       Follow up With Specialties Details Why Contact Info Additional Information    [facility] Schedule an appointment as soon as possible for a visit in 1 week For follow up regarding your symptoms and recheck [address] [place] [id number] [phone] [facility], [address] [place] [address], [address] [phone]            Patient's Medications   Discharge Prescriptions    ALBUTEROL (PROAIR HFA) 90 MCG/ACT INHALER    Inhale 2 puffs every 6 (six) hours as needed for wheezing       Start Date: 5/1/2024  End Date: --       Order Dose: 2 puffs       Quantity: 8.5 g    Refills: 0    PREDNISONE 20 MG TABLET    Take 2 tablets (40 mg total) by mouth daily for 4 days Do not start before May 2, 2024.       Start Date: 5/2/2024  End Date: 5/6/2024       Order Dose: 40 mg       Quantity: 8 tablet    Refills: 0     No discharge procedures on file.    PDMP Review       None             ED Provider  Attending physically available and evaluated Abelardo Burch Jr.. I managed the patient along with the ED Attending.    Electronically Signed by           Sue Parada MD  05/01/24 0350

## 2024-05-01 NOTE — ED NOTES
Patient sleeping in bed, complains of minor pain 2/10 in chest. States that it is better at this time, provider made aware. No further needs stated at this time.      Peggy Snow RN  05/01/24 [id number]

## 2024-05-08 LAB
ALBUMIN SERPL BCP-MCNC: 4.1 G/DL (ref 3.5–5)
ALP SERPL-CCNC: 71 U/L (ref 34–104)
ALT SERPL W P-5'-P-CCNC: 29 U/L (ref 7–52)
ANION GAP SERPL CALCULATED.3IONS-SCNC: 8 MMOL/L (ref 4–13)
AST SERPL W P-5'-P-CCNC: 27 U/L (ref 13–39)
BILIRUB SERPL-MCNC: 0.22 MG/DL (ref 0.2–1)
BUN SERPL-MCNC: 22 MG/DL (ref 5–25)
CALCIUM SERPL-MCNC: 8.8 MG/DL (ref 8.4–10.2)
CHLORIDE SERPL-SCNC: 102 MMOL/L (ref 96–108)
CO2 SERPL-SCNC: 29 MMOL/L (ref 21–32)
CREAT SERPL-MCNC: 0.99 MG/DL (ref 0.6–1.3)
GFR SERPL CREATININE-BSD FRML MDRD: 107 ML/MIN/1.73SQ M
GLUCOSE SERPL-MCNC: 83 MG/DL (ref 65–140)
POTASSIUM SERPL-SCNC: 4.2 MMOL/L (ref 3.5–5.3)
PROT SERPL-MCNC: 7.7 G/DL (ref 6.4–8.4)
SODIUM SERPL-SCNC: 139 MMOL/L (ref 135–147)

## 2024-08-28 ENCOUNTER — APPOINTMENT (OUTPATIENT)
Dept: LAB | Facility: HOSPITAL | Age: 24
End: 2024-08-28
Payer: MEDICARE

## 2025-05-03 ENCOUNTER — APPOINTMENT (EMERGENCY)
Dept: RADIOLOGY | Facility: HOSPITAL | Age: 25
End: 2025-05-03
Payer: MEDICARE

## 2025-05-03 ENCOUNTER — HOSPITAL ENCOUNTER (EMERGENCY)
Facility: HOSPITAL | Age: 25
Discharge: HOME | End: 2025-05-03
Attending: EMERGENCY MEDICINE
Payer: MEDICARE

## 2025-05-03 VITALS
HEART RATE: 86 BPM | OXYGEN SATURATION: 97 % | WEIGHT: 270.28 LBS | DIASTOLIC BLOOD PRESSURE: 71 MMHG | SYSTOLIC BLOOD PRESSURE: 144 MMHG | TEMPERATURE: 97.9 F | RESPIRATION RATE: 16 BRPM

## 2025-05-03 DIAGNOSIS — R06.2 WHEEZING: Primary | ICD-10-CM

## 2025-05-03 PROCEDURE — 94640 AIRWAY INHALATION TREATMENT: CPT

## 2025-05-03 PROCEDURE — 99284 EMERGENCY DEPT VISIT MOD MDM: CPT | Performed by: EMERGENCY MEDICINE

## 2025-05-03 PROCEDURE — 99284 EMERGENCY DEPT VISIT MOD MDM: CPT

## 2025-05-03 PROCEDURE — 71046 X-RAY EXAM CHEST 2 VIEWS: CPT

## 2025-05-03 RX ORDER — PREDNISONE 20 MG/1
60 TABLET ORAL DAILY
Qty: 15 TABLET | Refills: 0 | Status: SHIPPED | OUTPATIENT
Start: 2025-05-03 | End: 2025-05-08

## 2025-05-03 RX ORDER — FLUTICASONE PROPIONATE 50 MCG
1 SPRAY, SUSPENSION (ML) NASAL DAILY
Qty: 16 G | Refills: 0 | Status: SHIPPED | OUTPATIENT
Start: 2025-05-03

## 2025-05-03 RX ORDER — PREDNISONE 20 MG/1
60 TABLET ORAL ONCE
Status: COMPLETED | OUTPATIENT
Start: 2025-05-03 | End: 2025-05-03

## 2025-05-03 RX ORDER — IPRATROPIUM BROMIDE AND ALBUTEROL SULFATE 2.5; .5 MG/3ML; MG/3ML
3 SOLUTION RESPIRATORY (INHALATION) ONCE
Status: COMPLETED | OUTPATIENT
Start: 2025-05-03 | End: 2025-05-03

## 2025-05-03 RX ORDER — ALBUTEROL SULFATE 90 UG/1
2 INHALANT RESPIRATORY (INHALATION) EVERY 6 HOURS PRN
Qty: 8.5 G | Refills: 0 | Status: SHIPPED | OUTPATIENT
Start: 2025-05-03

## 2025-05-03 RX ADMIN — IPRATROPIUM BROMIDE AND ALBUTEROL SULFATE 3 ML: 2.5; .5 SOLUTION RESPIRATORY (INHALATION) at 18:54

## 2025-05-03 RX ADMIN — PREDNISONE 60 MG: 20 TABLET ORAL at 18:54

## 2025-05-03 NOTE — Clinical Note
Abelardo Burch was seen and treated in our emergency department on 5/3/2025.                Diagnosis:     Abelardo  may return to work on return date.    He may return on this date: 05/04/2025         If you have any questions or concerns, please don't hesitate to call.      Erin Langston, DO    ______________________________           _______________          _______________  Hospital Representative                              Date                                Time

## 2025-05-03 NOTE — ED PROVIDER NOTES
Pt Name: Abelardo Burch Jr.  MRN: [id number]  Birthdate 2000  Age/Sex: 24 y.o. male  Date of evaluation: 5/3/2025  PCP: No primary care provider on file.        FINAL IMPRESSION    Final diagnoses:   Wheezing         DISPOSITION/PLAN      Time reflects when diagnosis was documented in both MDM as applicable and the Disposition within this note       Time User Action Codes Description Comment    5/3/2025  7:13 PM Erin Langston Add [R06.2] Wheezing           ED Disposition       ED Disposition   Discharge    Condition   Stable    Date/Time   Sat May 3, 2025  7:12 PM    Comment   Abelardo Burch Jr. discharge to home/self care.                   Follow-up Information       Follow up With Specialties Details Why Contact Info Additional Information    [facility] Emergency Medicine Go to  As needed, If symptoms worsen [address] [place] [id number]  [phone] [facility], [address] [place] [address], [facility]              PATIENT REFERRED TO:    [facility] Emergency Department  [address] [place] [id number]  [phone]  Go to   As needed, If symptoms worsen      DISCHARGE MEDICATIONS:    Discharge Medication List as of 5/3/2025  7:18 PM        START taking these medications    Details   !! albuterol (ProAir HFA) 90 mcg/act inhaler Inhale 2 puffs every 6 (six) hours as needed for wheezing, Starting Sat 5/3/2025, Normal      fluticasone (FLONASE) 50 mcg/act nasal spray 1 spray into each nostril daily, Starting Sat 5/3/2025, Normal      loratadine-pseudoephedrine (CLARITIN-D 24-HOUR)  mg per 24 hr tablet Take 1 tablet by mouth daily, Starting Sat 5/3/2025, Normal      predniSONE 20 mg tablet Take 3 tablets (60 mg total) by mouth daily for 5 days, Starting Sat 5/3/2025, Until Thu 5/8/2025, Normal       !! - Potential duplicate medications found. Please discuss with  provider.        CONTINUE these medications which have NOT CHANGED    Details   !! albuterol (ProAir HFA) 90 mcg/act inhaler Inhale 2 puffs every 6 (six) hours as needed for wheezing, Starting Wed 5/1/2024, Normal      aluminum-magnesium hydroxide-simethicone (MAALOX MAX) 400-400-40 MG/5ML suspension Take 20 mL by mouth as needed for indigestion or heartburn, Starting Sun 7/2/2023, Normal       !! - Potential duplicate medications found. Please discuss with provider.          No discharge procedures on file.          CHIEF COMPLAINT    Chief Complaint   Patient presents with    Shortness of Breath     Pt been feeling intermittent SOB for the last week. Pt stated last night he woke up gasping for air. Associated symptoms of a cough          HPI    Ableardo presents to the Emergency Department complaining of wheezing and cough.  No fever.  SOB with exertion.       HPI      Past Medical and Surgical History    Past Medical History:   Diagnosis Date    Asthma     Rhinitis, allergic        History reviewed. No pertinent surgical history.    History reviewed. No pertinent family history.    Social History     Tobacco Use    Smoking status: Never    Smokeless tobacco: Never   Vaping Use    Vaping status: Never Used   Substance Use Topics    Alcohol use: Not Currently    Drug use: Not Currently     Types: Marijuana     Comment: stopped january 2, 2024         .    Allergies    Allergies   Allergen Reactions    Guaifenesin GI Intolerance       Home Medications    Prior to Admission medications    Medication Sig Start Date End Date Taking? Authorizing Provider   albuterol (ProAir HFA) 90 mcg/act inhaler Inhale 2 puffs every 6 (six) hours as needed for wheezing 5/1/24   Sue Parada MD   aluminum-magnesium hydroxide-simethicone (MAALOX MAX) 400-400-40 MG/5ML suspension Take 20 mL by mouth as needed for indigestion or heartburn  Patient not taking: Reported on 5/1/2024 7/2/23   Ezra Christina PA-C           Review of  Systems    Review of Systems   Constitutional:  Negative for chills and fever.   HENT:  Negative for ear pain and sore throat.    Eyes:  Negative for pain and visual disturbance.   Respiratory:  Positive for cough, shortness of breath and wheezing.    Cardiovascular:  Negative for chest pain and palpitations.   Gastrointestinal:  Negative for abdominal pain and vomiting.   Genitourinary:  Negative for dysuria and hematuria.   Musculoskeletal:  Negative for arthralgias and back pain.   Skin:  Negative for color change and rash.   Neurological:  Negative for seizures and syncope.   All other systems reviewed and are negative.        Physical Exam      ED Triage Vitals [05/03/25 1808]   Temperature Pulse Respirations Blood Pressure SpO2   97.9 °F (36.6 °C) 86 16 144/71 97 %      Temp Source Heart Rate Source Patient Position - Orthostatic VS BP Location FiO2 (%)   Oral Monitor Sitting Right arm --      Pain Score       --               Physical Exam  Vitals and nursing note reviewed.   Constitutional:       General: He is not in acute distress.     Appearance: He is well-developed. He is not diaphoretic.   HENT:      Head: Normocephalic and atraumatic.      Nose: Nose normal.   Eyes:      Conjunctiva/sclera: Conjunctivae normal.      Pupils: Pupils are equal, round, and reactive to light.   Cardiovascular:      Rate and Rhythm: Normal rate and regular rhythm.      Heart sounds: Normal heart sounds. No murmur heard.     No friction rub. No gallop.   Pulmonary:      Effort: Pulmonary effort is normal. No respiratory distress.      Breath sounds: Wheezing present. No rales.   Abdominal:      General: Bowel sounds are normal.      Palpations: Abdomen is soft.      Tenderness: There is no abdominal tenderness. There is no guarding or rebound.   Musculoskeletal:         General: Normal range of motion.      Cervical back: Normal range of motion and neck supple.   Skin:     General: Skin is warm and dry.   Neurological:       Mental Status: He is alert and oriented to person, place, and time.   Psychiatric:         Behavior: Behavior normal.                Assessment and Plan    Abelardo Burch Jr. is a 24 y.o. male who presents with cough and wheezing. Physical examination remarkable for mild wheezing. Differential diagnosis (not completely inclusive) includes asthma/ RAD/ allergic. Plan will be to perform diagnostic testing and treat symptomatically.      MDM      Diagnostic Results        Labs:Radiology:    XR chest 2 views    (Results Pending)     No acute pulmonary disease    All radiology studies independently viewed by me and interpreted by the radiologist.    Procedure    Procedures      ED Course of Care and Re-Assessments      Medications   ipratropium-albuterol (DUO-NEB) 0.5-2.5 mg/3 mL inhalation solution 3 mL (3 mL Nebulization Given 5/3/25 1854)   predniSONE tablet 60 mg (60 mg Oral Given 5/3/25 1854)                  DO Erin Stack DO  05/04/25 0133